# Patient Record
Sex: MALE
[De-identification: names, ages, dates, MRNs, and addresses within clinical notes are randomized per-mention and may not be internally consistent; named-entity substitution may affect disease eponyms.]

---

## 2021-02-25 ENCOUNTER — APPOINTMENT (OUTPATIENT)
Dept: PULMONOLOGY | Facility: CLINIC | Age: 56
End: 2021-02-25
Payer: MEDICAID

## 2021-02-25 DIAGNOSIS — J12.82 COVID-19: ICD-10-CM

## 2021-02-25 DIAGNOSIS — E78.00 PURE HYPERCHOLESTEROLEMIA, UNSPECIFIED: ICD-10-CM

## 2021-02-25 DIAGNOSIS — I10 ESSENTIAL (PRIMARY) HYPERTENSION: ICD-10-CM

## 2021-02-25 DIAGNOSIS — I63.9 CEREBRAL INFARCTION, UNSPECIFIED: ICD-10-CM

## 2021-02-25 DIAGNOSIS — U07.1 COVID-19: ICD-10-CM

## 2021-02-25 PROBLEM — Z00.00 ENCOUNTER FOR PREVENTIVE HEALTH EXAMINATION: Status: ACTIVE | Noted: 2021-02-25

## 2021-02-25 PROCEDURE — 99204 OFFICE O/P NEW MOD 45 MIN: CPT | Mod: 95

## 2021-02-25 RX ORDER — ACETAMINOPHEN 500 MG/1
500 TABLET ORAL 3 TIMES DAILY
Qty: 500 | Refills: 2 | Status: ACTIVE | COMMUNITY
Start: 2021-02-25 | End: 1900-01-01

## 2021-02-25 RX ORDER — DEXAMETHASONE 6 MG/1
6 TABLET ORAL DAILY
Qty: 5 | Refills: 0 | Status: ACTIVE | COMMUNITY
Start: 2021-02-25 | End: 1900-01-01

## 2021-02-25 RX ORDER — AZITHROMYCIN 250 MG/1
250 TABLET, FILM COATED ORAL
Qty: 1 | Refills: 0 | Status: ACTIVE | COMMUNITY
Start: 2021-02-25 | End: 1900-01-01

## 2021-02-25 NOTE — CONSULT LETTER
[Dear  ___] : Dear  [unfilled], [Courtesy Letter:] : I had the pleasure of seeing your patient, [unfilled], in my office today. [Please see my note below.] : Please see my note below. [Consult Closing:] : Thank you very much for allowing me to participate in the care of this patient.  If you have any questions, please do not hesitate to contact me. [Sincerely,] : Sincerely, [FreeTextEntry3] : Dr. Daniela Little

## 2021-02-25 NOTE — ASSESSMENT
[FreeTextEntry1] : I advised him on well hydration and rest.\par I advised him to take baby aspirin 81 mg p.o. daily for 30 days.\par I advised him to get pulse oximeter for better oxygen saturation monitoring.\par I am starting him on Z-Nino and dexamethasone 6 mg p.o. daily for 5 days.\par I will start him on monoclonal antibody IV infusion at this point.\par Will enroll him into the [facility] program with home care and lab works.\par \par \par Time spent in telehealth consultation, care coordination and charting is 60 minutes.

## 2021-02-25 NOTE — HISTORY OF PRESENT ILLNESS
[TextBox_4] : Maury is a pleasant 55-year-old gentleman with history of hypertension, hypercholesterolemia, he started feeling extreme fatigue about 3 days ago, but no shortness of breath, cough, chest pain, fever or chills.  He was diagnosed with bilateral COVID-19 pneumonia on a chest x-ray at [facility] yesterday, but refused hospital admission and signed out AMA.\par \par \par This visit was provided via telehealth using real-time 2-way audio visual technology.  The patient, MAURY ROGERS, was located at home, [address] [place] [address] at the time of the visit.  \par The provider, Daniela Little, was located at the office [address], [address], [place], [place] at the time of the visit. \par The patient, Mr. MAURY ROGERS  and physician Daniela Little DO, participated in the telehealth encounter.\par \par Verbal consent was obtained by the  from patient.\par

## 2021-03-01 LAB
ALBUMIN SERPL ELPH-MCNC: 3.2 G/DL
ALP BLD-CCNC: 71 U/L
ALT SERPL-CCNC: 22 U/L
ANION GAP SERPL CALC-SCNC: 12 MMOL/L
AST SERPL-CCNC: 43 U/L
BASOPHILS # BLD AUTO: 0.02 K/UL
BASOPHILS NFR BLD AUTO: 0.3 %
BILIRUB SERPL-MCNC: 0.7 MG/DL
BUN SERPL-MCNC: 14 MG/DL
CALCIUM SERPL-MCNC: 8.4 MG/DL
CHLORIDE SERPL-SCNC: 99 MMOL/L
CO2 SERPL-SCNC: 24 MMOL/L
CREAT SERPL-MCNC: 1.33 MG/DL
CRP SERPL-MCNC: 13.66 MG/DL
DEPRECATED D DIMER PPP IA-ACNC: 352 NG/ML DDU
EOSINOPHIL # BLD AUTO: 0.01 K/UL
EOSINOPHIL NFR BLD AUTO: 0.2 %
FERRITIN SERPL-MCNC: 1768 NG/ML
GLUCOSE SERPL-MCNC: 87 MG/DL
HCT VFR BLD CALC: 35.4 %
HGB BLD-MCNC: 12.2 G/DL
IMM GRANULOCYTES NFR BLD AUTO: 0.3 %
LYMPHOCYTES # BLD AUTO: 1.18 K/UL
LYMPHOCYTES NFR BLD AUTO: 18.7 %
MAN DIFF?: NORMAL
MCHC RBC-ENTMCNC: 30.6 PG
MCHC RBC-ENTMCNC: 34.5 GM/DL
MCV RBC AUTO: 88.7 FL
MONOCYTES # BLD AUTO: 0.41 K/UL
MONOCYTES NFR BLD AUTO: 6.5 %
NEUTROPHILS # BLD AUTO: 4.67 K/UL
NEUTROPHILS NFR BLD AUTO: 74 %
PLATELET # BLD AUTO: 221 K/UL
POTASSIUM SERPL-SCNC: 3.5 MMOL/L
PROCALCITONIN SERPL-MCNC: 0.11 NG/ML
PROT SERPL-MCNC: 6.6 G/DL
RBC # BLD: 3.99 M/UL
RBC # FLD: 14.9 %
SODIUM SERPL-SCNC: 135 MMOL/L
WBC # FLD AUTO: 6.31 K/UL

## 2021-10-06 PROBLEM — U07.1 PNEUMONIA DUE TO COVID-19 VIRUS: Status: ACTIVE | Noted: 2021-02-25
